# Patient Record
Sex: FEMALE | Race: WHITE
[De-identification: names, ages, dates, MRNs, and addresses within clinical notes are randomized per-mention and may not be internally consistent; named-entity substitution may affect disease eponyms.]

---

## 2018-02-07 ENCOUNTER — HOSPITAL ENCOUNTER (EMERGENCY)
Dept: HOSPITAL 17 - NED | Age: 38
Discharge: LEFT BEFORE DISCHARGE | End: 2018-02-07
Payer: SELF-PAY

## 2018-02-07 VITALS — WEIGHT: 185.39 LBS | HEIGHT: 62 IN | BODY MASS INDEX: 34.11 KG/M2

## 2018-02-07 VITALS
TEMPERATURE: 98.9 F | DIASTOLIC BLOOD PRESSURE: 84 MMHG | RESPIRATION RATE: 16 BRPM | OXYGEN SATURATION: 97 % | SYSTOLIC BLOOD PRESSURE: 166 MMHG | HEART RATE: 77 BPM

## 2018-02-07 DIAGNOSIS — Z53.21: Primary | ICD-10-CM

## 2018-02-07 PROCEDURE — 99281 EMR DPT VST MAYX REQ PHY/QHP: CPT

## 2018-02-07 NOTE — PD
Physical Exam


Date Seen by Provider:  Feb 7, 2018


Time Seen by Provider:  20:14


Narrative


37-year-old female presents to the emergency department reporting a broken 

tooth in another chipped tooth.  Current pain is 6/10.





Data


Data


Last Documented VS





Vital Signs








  Date Time  Temp Pulse Resp B/P (MAP) Pulse Ox O2 Delivery O2 Flow Rate FiO2


 


2/7/18 19:52 98.9 77 16 166/84 (111) 97   











MDM


Supervised Visit with FAUSTINO:  No


Narrative Course


37-year-old female presents to the emergency department for evaluation of 

dental pain.  Patient was initially seen in triage.  She left AGAINST MEDICAL 

ADVICE before she could be moved to medical bed.


Diagnosis





 Primary Impression:  


 Left against medical advice


Scripts


No Active Prescriptions or Reported Meds


Disposition:  07 AGAINST MEDICAL ADVICE











Nargis Quinonez Feb 7, 2018 20:20

## 2018-03-20 ENCOUNTER — HOSPITAL ENCOUNTER (EMERGENCY)
Dept: HOSPITAL 17 - NED | Age: 38
Discharge: HOME | End: 2018-03-20
Payer: SELF-PAY

## 2018-03-20 VITALS — BODY MASS INDEX: 35.04 KG/M2 | HEIGHT: 62 IN | WEIGHT: 190.39 LBS

## 2018-03-20 VITALS
OXYGEN SATURATION: 100 % | SYSTOLIC BLOOD PRESSURE: 134 MMHG | HEART RATE: 99 BPM | RESPIRATION RATE: 18 BRPM | TEMPERATURE: 98.4 F | DIASTOLIC BLOOD PRESSURE: 84 MMHG

## 2018-03-20 DIAGNOSIS — T14.8XXA: Primary | ICD-10-CM
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
## 2018-03-20 PROCEDURE — 99283 EMERGENCY DEPT VISIT LOW MDM: CPT

## 2018-03-20 NOTE — PD
HPI


Chief Complaint:  Edema


Time Seen by Provider:  21:50


Travel History


International Travel<30 days:  No


Contact w/Intl Traveler<30days:  No


Traveled to known affect area:  No





History of Present Illness


HPI


37-year-old female presents emergency department complaining of bilateral hands 

and feet swelling for approximately 1 week.  Says her feet feel "hot, burning, 

and tingling" and they have been swelling for several days.  Says in addition, 

she was bit by her cat and her friend's dog about 5 days ago.  The PET 

immunizations are up-to-date and they are well known to her and her friend.  

Patient states she drinks alcohol occasionally and does not drink every day.  

She does not have a primary care physician.  Denies chronic medical issues or 

medication use.  Patient denies trauma, fever, chills, chest pain, shortness of 

breath.





PFSH


Past Medical History


Arthritis:  No


Anxiety:  Yes


Depression:  Yes


Heart Rhythm Problems:  No


Cancer:  No


Cardiovascular Problems:  No


High Cholesterol:  No


Chest Pain:  No


Congestive Heart Failure:  No


COPD:  No


Cerebrovascular Accident:  No


Diabetes:  No


Diminished Hearing:  No


Gastrointestinal Disorders:  No


GERD:  No


Genitourinary:  No


Headaches:  Yes


Hepatitis:  No


Hiatal Hernia:  No


Hypertension:  No


Kidney Stones:  Yes


Musculoskeletal:  Yes (JAW BROKEN, ribs, R knee)


Neurologic:  No


Reproductive:  No


Respiratory:  No


Immunizations Current:  Yes


Migraines:  No


Renal Failure:  No


Seizures:  No


Sleep Apnea:  No


Thyroid Disease:  No


Ulcer:  No


:  4


Para:  3


Tubal Ligation:  Yes





Past Surgical History


Abdominal Surgery:  No


Cardiac Surgery:  No


 Section:  Yes (X2)


Ear Surgery:  No


Endocrine Surgery:  No


Eye Surgery:  No


Genitourinary Surgery:  No


Gynecologic Surgery:  Yes ( CERCLAGE)


Neurologic Surgery:  No


Oral Surgery:  Yes (WISDOM TEETH EXTRACTED )


Pacemaker:  No


Thoracic Surgery:  No


Other Surgery:  Yes





Social History


Alcohol Use:  Yes (GREATER THAN 5TH PER DAY)


Tobacco Use:  Yes (1/2 PPD)


Substance Use:  No (hx of)





Allergies-Medications


(Allergen,Severity, Reaction):  


Coded Allergies:  


     No Known Allergies (Unverified , 16)


Reported Meds & Prescriptions





Reported Meds & Active Scripts


Active


Augmentin (Amoxicillin-Clavulanate) 875-125 Mg Tab 1 Tab PO BID








Review of Systems


Except as stated in HPI:  all other systems reviewed are Neg





Physical Exam


Narrative


GENERAL: WD, WN in NAD


SKIN: Focused skin assessment warm/dry.


HEAD: Atraumatic. Normocephalic. 


EYES: Pupils equal and round. No scleral icterus. No injection or drainage. 


ENT: No nasal bleeding or discharge.  Mucous membranes pink and moist.


NECK: Trachea midline. No JVD. 


CARDIOVASCULAR: Regular rate and rhythm.  No murmur appreciated.


RESPIRATORY: No accessory muscle use. Clear to auscultation. Breath sounds 

equal bilaterally. 


GASTROINTESTINAL: Abdomen soft, non-tender, nondistended. Hepatic and splenic 

margins not palpable. 


MUSCULOSKELETAL: No obvious deformities. No clubbing.  No cyanosis.  No edema. 


Bilateral upper extremities-fingertips distal to MCPs with mild erythema with 

mild edema, neurovascularly intact. 


Bilateral lower extremities without edema, erythema, neurovascularly intact.


NEUROLOGICAL: Awake and alert. No obvious cranial nerve deficits.  Motor 

grossly within normal limits. Normal speech.


PSYCHIATRIC: Appropriate mood and affect; insight and judgment normal.





Data


Data


Last Documented VS





Vital Signs








  Date Time  Temp Pulse Resp B/P (MAP) Pulse Ox O2 Delivery O2 Flow Rate FiO2


 


3/20/18 19:15 98.4 99 18 134/84 (101) 100   








Orders





 Orders


Amoxicil-Clavulanate (Augmentin) (3/20/18 22:00)


Ed Discharge Order (3/20/18 22:25)








[facility]


Medical Decision Making


Medical Screen Exam Complete:  Yes


Emergency Medical Condition:  Yes


Differential Diagnosis


Bilateral upper extremity cellulitis, cat bites, prophylaxis, vitamin deficiency


Narrative Course


37-year-old female presents emergency room for evaluation of bilateral hands 

and feet burning and edema.  Denies recent exposures, frequent handwashing, new 

foods, new job.


Vital signs stable.


Physical exam findings unremarkable lower extremities.  No edema or erythema 

present.  Bilateral upper hands with erythema with mild edema distal to MCPs.  

Pattern is consistent with a contact dermatitis although patient denies history 

of contacts.


Review the EMR demonstrates patient has had significant alcohol use. 


Patient will be covered with Augmentin for the animal bites.  Last tetanus in 

2017.


I suspect that patient may have a vitamin deficiency resulting in some of her 

symptoms today although do not feel that this requires emergent replacement.  

Patient's history and physical is consistent with some form of neuropathy. 


I did advise that patient may have a vitamin deficiency and advised that she 

should take a multivitamin daily.  Strongly advised patient follow-up with a 

primary care physician for further treatment and evaluation.


Patient is strongly advised to follow-up with her primary care physician.  

[facility] information given.





Diagnosis





 Primary Impression:  


 Animal bite


Referrals:  


[facility]





***Additional Instructions:  


Follow-up with [facility] as discussed.


Consider taking a multivitamin daily for your symptoms.  Ensure adequate fluid 

intake and proper nutrition.


If your symptoms persist or worsen return to the emergency department 

immediately.


Take all medications as prescribed.


Scripts


Amoxicillin-Clavulanate (Augmentin) 875-125 Mg Tab


1 TAB PO BID for Infection, #14 TAB 0 Refills


   Prov: Che Alatorre MD         3/20/18


Disposition:  01 DISCHARGE HOME


Condition:  Stable











Leyda Buck Mar 20, 2018 22:03

## 2018-03-21 ENCOUNTER — HOSPITAL ENCOUNTER (EMERGENCY)
Dept: HOSPITAL 17 - NEPD | Age: 38
Discharge: LEFT BEFORE DISCHARGE | End: 2018-03-21
Payer: SELF-PAY

## 2018-03-21 VITALS
SYSTOLIC BLOOD PRESSURE: 167 MMHG | OXYGEN SATURATION: 98 % | RESPIRATION RATE: 16 BRPM | HEART RATE: 113 BPM | DIASTOLIC BLOOD PRESSURE: 108 MMHG | TEMPERATURE: 98.4 F

## 2018-03-21 VITALS — HEIGHT: 62 IN | WEIGHT: 198.42 LBS | BODY MASS INDEX: 36.51 KG/M2

## 2018-03-21 DIAGNOSIS — Z00.00: Primary | ICD-10-CM

## 2018-03-21 DIAGNOSIS — Z53.21: ICD-10-CM

## 2018-03-21 LAB
BACTERIA #/AREA URNS HPF: (no result) /HPF
COLOR UR: YELLOW
GLUCOSE UR STRIP-MCNC: (no result) MG/DL
HGB UR QL STRIP: (no result)
KETONES UR STRIP-MCNC: 10 MG/DL
NITRITE UR QL STRIP: (no result)
SP GR UR STRIP: 1.02 (ref 1–1.03)
SQUAMOUS #/AREA URNS HPF: 1 /HPF (ref 0–5)
URINE LEUKOCYTE ESTERASE: (no result)
WHITE BLOOD CELL CLUMPS: (no result)

## 2018-03-21 PROCEDURE — 87086 URINE CULTURE/COLONY COUNT: CPT

## 2018-03-21 PROCEDURE — 99281 EMR DPT VST MAYX REQ PHY/QHP: CPT

## 2018-03-21 PROCEDURE — 81001 URINALYSIS AUTO W/SCOPE: CPT
